# Patient Record
Sex: FEMALE | Race: WHITE | Employment: OTHER | URBAN - METROPOLITAN AREA
[De-identification: names, ages, dates, MRNs, and addresses within clinical notes are randomized per-mention and may not be internally consistent; named-entity substitution may affect disease eponyms.]

---

## 2021-07-26 ENCOUNTER — HOSPITAL ENCOUNTER (OUTPATIENT)
Age: 46
Setting detail: OBSERVATION
Discharge: NURSING, REHAB OR LONG-TERM CARE | End: 2021-07-30
Attending: EMERGENCY MEDICINE | Admitting: INTERNAL MEDICINE
Payer: MEDICAID

## 2021-07-26 ENCOUNTER — APPOINTMENT (OUTPATIENT)
Dept: GENERAL RADIOLOGY | Age: 46
End: 2021-07-26
Payer: MEDICAID

## 2021-07-26 DIAGNOSIS — Z78.9 UNABLE TO CARE FOR SELF: ICD-10-CM

## 2021-07-26 DIAGNOSIS — B35.3 TINEA PEDIS OF BOTH FEET: ICD-10-CM

## 2021-07-26 DIAGNOSIS — G80.9 CEREBRAL PALSY, UNSPECIFIED TYPE (HCC): Primary | ICD-10-CM

## 2021-07-26 LAB
A/G RATIO: 1.4 (ref 1.1–2.2)
ALBUMIN SERPL-MCNC: 4.7 G/DL (ref 3.4–5)
ALP BLD-CCNC: 55 U/L (ref 40–129)
ALT SERPL-CCNC: 11 U/L (ref 10–40)
ANION GAP SERPL CALCULATED.3IONS-SCNC: 12 MMOL/L (ref 3–16)
AST SERPL-CCNC: 13 U/L (ref 15–37)
BACTERIA: ABNORMAL /HPF
BASOPHILS ABSOLUTE: 0.1 K/UL (ref 0–0.2)
BASOPHILS RELATIVE PERCENT: 0.6 %
BILIRUB SERPL-MCNC: <0.2 MG/DL (ref 0–1)
BILIRUBIN URINE: NEGATIVE
BLOOD, URINE: ABNORMAL
BUN BLDV-MCNC: 14 MG/DL (ref 7–20)
CALCIUM SERPL-MCNC: 10 MG/DL (ref 8.3–10.6)
CHLORIDE BLD-SCNC: 104 MMOL/L (ref 99–110)
CLARITY: CLEAR
CO2: 23 MMOL/L (ref 21–32)
COLOR: YELLOW
CREAT SERPL-MCNC: 0.6 MG/DL (ref 0.6–1.1)
EOSINOPHILS ABSOLUTE: 0.1 K/UL (ref 0–0.6)
EOSINOPHILS RELATIVE PERCENT: 0.7 %
EPITHELIAL CELLS, UA: ABNORMAL /HPF (ref 0–5)
GFR AFRICAN AMERICAN: >60
GFR NON-AFRICAN AMERICAN: >60
GLOBULIN: 3.3 G/DL
GLUCOSE BLD-MCNC: 126 MG/DL (ref 70–99)
GLUCOSE URINE: NEGATIVE MG/DL
HCT VFR BLD CALC: 36.2 % (ref 36–48)
HEMOGLOBIN: 12.5 G/DL (ref 12–16)
KETONES, URINE: NEGATIVE MG/DL
LACTIC ACID, SEPSIS: 0.9 MMOL/L (ref 0.4–1.9)
LEUKOCYTE ESTERASE, URINE: NEGATIVE
LYMPHOCYTES ABSOLUTE: 1.4 K/UL (ref 1–5.1)
LYMPHOCYTES RELATIVE PERCENT: 16 %
MCH RBC QN AUTO: 30.3 PG (ref 26–34)
MCHC RBC AUTO-ENTMCNC: 34.5 G/DL (ref 31–36)
MCV RBC AUTO: 87.9 FL (ref 80–100)
MICROSCOPIC EXAMINATION: YES
MONOCYTES ABSOLUTE: 0.6 K/UL (ref 0–1.3)
MONOCYTES RELATIVE PERCENT: 6.3 %
NEUTROPHILS ABSOLUTE: 6.8 K/UL (ref 1.7–7.7)
NEUTROPHILS RELATIVE PERCENT: 76.4 %
NITRITE, URINE: NEGATIVE
PDW BLD-RTO: 13.1 % (ref 12.4–15.4)
PH UA: 6.5 (ref 5–8)
PLATELET # BLD: 287 K/UL (ref 135–450)
PMV BLD AUTO: 8.4 FL (ref 5–10.5)
POTASSIUM REFLEX MAGNESIUM: 3.6 MMOL/L (ref 3.5–5.1)
PROTEIN UA: NEGATIVE MG/DL
RBC # BLD: 4.12 M/UL (ref 4–5.2)
RBC UA: ABNORMAL /HPF (ref 0–4)
SODIUM BLD-SCNC: 139 MMOL/L (ref 136–145)
SPECIFIC GRAVITY UA: 1.02 (ref 1–1.03)
TOTAL PROTEIN: 8 G/DL (ref 6.4–8.2)
TROPONIN: <0.01 NG/ML
URINE REFLEX TO CULTURE: ABNORMAL
URINE TYPE: ABNORMAL
UROBILINOGEN, URINE: 0.2 E.U./DL
WBC # BLD: 8.9 K/UL (ref 4–11)
WBC UA: ABNORMAL /HPF (ref 0–5)

## 2021-07-26 PROCEDURE — P9612 CATHETERIZE FOR URINE SPEC: HCPCS

## 2021-07-26 PROCEDURE — 93005 ELECTROCARDIOGRAM TRACING: CPT | Performed by: PHYSICIAN ASSISTANT

## 2021-07-26 PROCEDURE — 71045 X-RAY EXAM CHEST 1 VIEW: CPT

## 2021-07-26 PROCEDURE — 99283 EMERGENCY DEPT VISIT LOW MDM: CPT

## 2021-07-26 PROCEDURE — 80053 COMPREHEN METABOLIC PANEL: CPT

## 2021-07-26 PROCEDURE — 85025 COMPLETE CBC W/AUTO DIFF WBC: CPT

## 2021-07-26 PROCEDURE — 84484 ASSAY OF TROPONIN QUANT: CPT

## 2021-07-26 PROCEDURE — 2580000003 HC RX 258: Performed by: PHYSICIAN ASSISTANT

## 2021-07-26 PROCEDURE — 96360 HYDRATION IV INFUSION INIT: CPT

## 2021-07-26 PROCEDURE — 83605 ASSAY OF LACTIC ACID: CPT

## 2021-07-26 PROCEDURE — 81001 URINALYSIS AUTO W/SCOPE: CPT

## 2021-07-26 RX ORDER — 0.9 % SODIUM CHLORIDE 0.9 %
1000 INTRAVENOUS SOLUTION INTRAVENOUS ONCE
Status: COMPLETED | OUTPATIENT
Start: 2021-07-26 | End: 2021-07-26

## 2021-07-26 RX ADMIN — SODIUM CHLORIDE 1000 ML: 9 INJECTION, SOLUTION INTRAVENOUS at 20:49

## 2021-07-27 LAB
ANION GAP SERPL CALCULATED.3IONS-SCNC: 13 MMOL/L (ref 3–16)
BASOPHILS ABSOLUTE: 0 K/UL (ref 0–0.2)
BASOPHILS RELATIVE PERCENT: 0.6 %
BUN BLDV-MCNC: 11 MG/DL (ref 7–20)
CALCIUM SERPL-MCNC: 9.6 MG/DL (ref 8.3–10.6)
CHLORIDE BLD-SCNC: 107 MMOL/L (ref 99–110)
CO2: 21 MMOL/L (ref 21–32)
CREAT SERPL-MCNC: <0.5 MG/DL (ref 0.6–1.1)
EKG ATRIAL RATE: 102 BPM
EKG DIAGNOSIS: NORMAL
EKG P AXIS: 48 DEGREES
EKG P-R INTERVAL: 128 MS
EKG Q-T INTERVAL: 528 MS
EKG QRS DURATION: 72 MS
EKG QTC CALCULATION (BAZETT): 688 MS
EKG R AXIS: 88 DEGREES
EKG T AXIS: 58 DEGREES
EKG VENTRICULAR RATE: 102 BPM
EOSINOPHILS ABSOLUTE: 0.1 K/UL (ref 0–0.6)
EOSINOPHILS RELATIVE PERCENT: 1 %
GFR AFRICAN AMERICAN: >60
GFR NON-AFRICAN AMERICAN: >60
GLUCOSE BLD-MCNC: 103 MG/DL (ref 70–99)
HCT VFR BLD CALC: 34.7 % (ref 36–48)
HEMOGLOBIN: 11.7 G/DL (ref 12–16)
LYMPHOCYTES ABSOLUTE: 1.4 K/UL (ref 1–5.1)
LYMPHOCYTES RELATIVE PERCENT: 21.2 %
MCH RBC QN AUTO: 30.4 PG (ref 26–34)
MCHC RBC AUTO-ENTMCNC: 33.8 G/DL (ref 31–36)
MCV RBC AUTO: 89.8 FL (ref 80–100)
MONOCYTES ABSOLUTE: 0.4 K/UL (ref 0–1.3)
MONOCYTES RELATIVE PERCENT: 5.5 %
NEUTROPHILS ABSOLUTE: 4.8 K/UL (ref 1.7–7.7)
NEUTROPHILS RELATIVE PERCENT: 71.7 %
PDW BLD-RTO: 13.1 % (ref 12.4–15.4)
PLATELET # BLD: 265 K/UL (ref 135–450)
PMV BLD AUTO: 8.6 FL (ref 5–10.5)
POTASSIUM REFLEX MAGNESIUM: 3.6 MMOL/L (ref 3.5–5.1)
RBC # BLD: 3.86 M/UL (ref 4–5.2)
SODIUM BLD-SCNC: 141 MMOL/L (ref 136–145)
WBC # BLD: 6.7 K/UL (ref 4–11)

## 2021-07-27 PROCEDURE — 36415 COLL VENOUS BLD VENIPUNCTURE: CPT

## 2021-07-27 PROCEDURE — G0378 HOSPITAL OBSERVATION PER HR: HCPCS

## 2021-07-27 PROCEDURE — 97166 OT EVAL MOD COMPLEX 45 MIN: CPT

## 2021-07-27 PROCEDURE — 80048 BASIC METABOLIC PNL TOTAL CA: CPT

## 2021-07-27 PROCEDURE — 97162 PT EVAL MOD COMPLEX 30 MIN: CPT

## 2021-07-27 PROCEDURE — 96372 THER/PROPH/DIAG INJ SC/IM: CPT

## 2021-07-27 PROCEDURE — 97110 THERAPEUTIC EXERCISES: CPT

## 2021-07-27 PROCEDURE — 93010 ELECTROCARDIOGRAM REPORT: CPT | Performed by: INTERNAL MEDICINE

## 2021-07-27 PROCEDURE — 2580000003 HC RX 258: Performed by: NURSE PRACTITIONER

## 2021-07-27 PROCEDURE — 97535 SELF CARE MNGMENT TRAINING: CPT

## 2021-07-27 PROCEDURE — 6360000002 HC RX W HCPCS: Performed by: NURSE PRACTITIONER

## 2021-07-27 PROCEDURE — 85025 COMPLETE CBC W/AUTO DIFF WBC: CPT

## 2021-07-27 PROCEDURE — 97116 GAIT TRAINING THERAPY: CPT

## 2021-07-27 RX ORDER — ACETAMINOPHEN 650 MG/1
650 SUPPOSITORY RECTAL EVERY 6 HOURS PRN
Status: DISCONTINUED | OUTPATIENT
Start: 2021-07-27 | End: 2021-07-30 | Stop reason: HOSPADM

## 2021-07-27 RX ORDER — SODIUM CHLORIDE 0.9 % (FLUSH) 0.9 %
10 SYRINGE (ML) INJECTION PRN
Status: DISCONTINUED | OUTPATIENT
Start: 2021-07-27 | End: 2021-07-30 | Stop reason: HOSPADM

## 2021-07-27 RX ORDER — ONDANSETRON 2 MG/ML
4 INJECTION INTRAMUSCULAR; INTRAVENOUS EVERY 6 HOURS PRN
Status: DISCONTINUED | OUTPATIENT
Start: 2021-07-27 | End: 2021-07-27

## 2021-07-27 RX ORDER — SODIUM CHLORIDE 0.9 % (FLUSH) 0.9 %
10 SYRINGE (ML) INJECTION EVERY 12 HOURS SCHEDULED
Status: DISCONTINUED | OUTPATIENT
Start: 2021-07-27 | End: 2021-07-30 | Stop reason: HOSPADM

## 2021-07-27 RX ORDER — ONDANSETRON 4 MG/1
4 TABLET, ORALLY DISINTEGRATING ORAL EVERY 8 HOURS PRN
Status: DISCONTINUED | OUTPATIENT
Start: 2021-07-27 | End: 2021-07-27

## 2021-07-27 RX ORDER — SODIUM CHLORIDE 9 MG/ML
25 INJECTION, SOLUTION INTRAVENOUS PRN
Status: DISCONTINUED | OUTPATIENT
Start: 2021-07-27 | End: 2021-07-30 | Stop reason: HOSPADM

## 2021-07-27 RX ORDER — ACETAMINOPHEN 325 MG/1
650 TABLET ORAL EVERY 6 HOURS PRN
Status: DISCONTINUED | OUTPATIENT
Start: 2021-07-27 | End: 2021-07-30 | Stop reason: HOSPADM

## 2021-07-27 RX ADMIN — Medication 10 ML: at 10:44

## 2021-07-27 RX ADMIN — Medication 10 ML: at 21:11

## 2021-07-27 RX ADMIN — ENOXAPARIN SODIUM 40 MG: 40 INJECTION SUBCUTANEOUS at 10:43

## 2021-07-27 ASSESSMENT — ENCOUNTER SYMPTOMS
ABDOMINAL PAIN: 0
EYE PAIN: 0
SORE THROAT: 0
COUGH: 0
BACK PAIN: 0
SHORTNESS OF BREATH: 0
NAUSEA: 0
VOMITING: 0

## 2021-07-27 NOTE — ED PROVIDER NOTES
[address] [facility]  ED  EMERGENCY DEPARTMENT ENCOUNTER        Pt Name: Joseph Bolton  MRN: [id number]  Armstrongfkaran 1975  Date of evaluation: 2021  Provider: TANI Ma  PCP: Laura Castro MD  Note Started: 1:11 AM EDT        I have seen and evaluated this patient with my supervising physician Marla West MD.    CHIEF COMPLAINT       Chief Complaint   Patient presents with    Other     brought in by brother. has cerebral palsy, mom and dad have been lifetime caregivers. mom  last year, father is currently hospitalized. patient not able to care for self. HISTORY OF PRESENT ILLNESS   (Location, Timing/Onset, Context/Setting, Quality, Duration, Modifying Factors, Severity, Associated Signs and Symptoms)  Note limiting factors. Chief Complaint: Unable to care for self     Joseph Bolton is a 39 y.o. female who presents significant past medical history of cerebral palsy presents emergency room with brother with chief concern of unable to care for herself. Patient has always had her mother and her father be her caregivers. Her mother passed away 1 year ago. Her father is currently admitted to the hospital and will likely be placed in a nursing home. Brother is found out that patient has not had been able to properly care for herself over the last year. Notes significant dryness of the skin of her feet. States that she has not had not bathed in over 1 year. She requires lifting assistance, he has a bad back and is unable to care for. He also has his own family care for it there is no one else who can help care for this patient. Contacted her insurance company, they recommend she go to the emergency room for placement. Patient is agreeable with this plan. She denies any medical complaints at this time. Nursing Notes were all reviewed and agreed with or any disagreements were addressed in the HPI.     REVIEW OF SYSTEMS    (2-9 systems for level 4, 10 or more for level 5)     Review of Systems   Constitutional: Negative for fever. HENT: Negative for sore throat. Eyes: Negative for pain and visual disturbance. Respiratory: Negative for cough and shortness of breath. Cardiovascular: Negative for chest pain. Gastrointestinal: Negative for abdominal pain, nausea and vomiting. Genitourinary: Negative for dysuria and frequency. Musculoskeletal: Negative for back pain and neck pain. Skin: Negative for rash. Neurological: Negative for dizziness, weakness, numbness and headaches. Psychiatric/Behavioral: Negative for confusion. Positives and Pertinent negatives as per HPI. Except as noted above in the ROS, all other systems were reviewed and negative. PAST MEDICAL HISTORY     Past Medical History:   Diagnosis Date    Cerebral palsy ([facility] [address]          SURGICAL HISTORY   History reviewed. No pertinent surgical history. CURRENTMEDICATIONS       Previous Medications    No medications on file         ALLERGIES     Patient has no allergy information on record. FAMILYHISTORY     History reviewed. No pertinent family history. SOCIAL HISTORY       Social History     Tobacco Use    Smoking status: Never Smoker    Smokeless tobacco: Never Used   Substance Use Topics    Alcohol use: Never    Drug use: Never       SCREENINGS             PHYSICAL EXAM    (up to 7 for level 4, 8 or more for level 5)     ED Triage Vitals [07/26/21 1845]   BP Temp Temp Source Pulse Resp SpO2 Height Weight   139/71 97.7 °F (36.5 °C) Oral 106 18 100 % 5' 5\" (1.651 m) 100 lb (45.4 kg)       Physical Exam  Vitals reviewed. Constitutional:       Appearance: She is not diaphoretic. HENT:      Nose: No congestion or rhinorrhea. Eyes:      General: No scleral icterus. Conjunctiva/sclera: Conjunctivae normal.   Cardiovascular:      Rate and Rhythm: Regular rhythm. Tachycardia present. Pulses: Normal pulses. Heart sounds: Normal heart sounds.  No murmur heard.   No friction rub. No gallop. Pulmonary:      Effort: Pulmonary effort is normal. No respiratory distress. Breath sounds: Normal breath sounds. No stridor. No wheezing, rhonchi or rales. Abdominal:      General: There is no distension. Palpations: Abdomen is soft. Tenderness: There is no abdominal tenderness. There is no guarding or rebound. Musculoskeletal:         General: No swelling or tenderness. Normal range of motion. Cervical back: Normal range of motion and neck supple. Skin:     General: Skin is warm and dry. Capillary Refill: Capillary refill takes less than 2 seconds. Comments: Bilateral tinea pedis. Neurological:      General: No focal deficit present. Mental Status: She is alert and oriented to person, place, and time. Sensory: No sensory deficit. Motor: No weakness. Psychiatric:         Behavior: Behavior normal.      Comments: Depressed mood and affect. Tearful.          DIAGNOSTIC RESULTS   LABS:    Labs Reviewed   COMPREHENSIVE METABOLIC PANEL W/ REFLEX TO MG FOR LOW K - Abnormal; Notable for the following components:       Result Value    Glucose 126 (*)     AST 13 (*)     All other components within normal limits    Narrative:     Performed at:  [place] [address] [email or web address]   Phone [phone]   URINE RT REFLEX TO CULTURE - Abnormal; Notable for the following components:    Blood, Urine MODERATE (*)     All other components within normal limits    Narrative:     Performed at:  [place] [address] [email or web address]   Phone [phone]   MICROSCOPIC URINALYSIS - Abnormal; Notable for the following components:    RBC, UA 11-20 (*)     Epithelial Cells, UA 11-20 (*)     Bacteria, UA 1+ (*)     All other components within normal limits    Narrative:     Performed at:  [facility]  [address] [place] [address], [place], [place] [employer]   Phone [phone]   CBC WITH AUTO DIFFERENTIAL    Narrative:     Performed at:  [facility]) - [address],  [place], [place] [employer]   Phone [phone]   LACTATE, SEPSIS    Narrative:     Performed at:  [facility]) [address]  [place], [place] [employer]   Phone [phone]   TROPONIN    Narrative:     Performed at:  [facility]) - [address]  [place], [place] [employer]   Phone [phone]       When ordered only abnormal lab results are displayed. All other labs were within normal range or not returned as of this dictation. EKG: When ordered, EKG's are interpreted by the Emergency Department Physician in the absence of a cardiologist.  Please see their note for interpretation of EKG. RADIOLOGY:   Non-plain film images such as CT, Ultrasound and MRI are read by the radiologist. Plain radiographic images are visualized and preliminarily interpreted by the ED Provider with the below findings:        Interpretation per the Radiologist below, if available at the time of this note:    XR CHEST PORTABLE   Final Result   Mild chronic obstructive lung changes with mild discoid atelectasis or   scarring along the perihilar region on the right and extending inferiorly           XR CHEST PORTABLE    Result Date: 7/26/2021  EXAMINATION: ONE XRAY VIEW OF THE CHEST 7/26/2021 8:16 pm COMPARISON: None. HISTORY: ORDERING SYSTEM PROVIDED HISTORY: tachycardia TECHNOLOGIST PROVIDED HISTORY: Reason for exam:->tachycardia Reason for Exam: tachycardia Acuity: Acute Type of Exam: Initial FINDINGS: The heart is normal.  The pulmonary vessels are normal.  The lungs are mildly hyperinflated with mild linear densities scattered along the right perihilar region extending inferiorly. No effusion or consolidation is seen. The bones are intact.      Mild chronic obstructive lung changes with mild discoid atelectasis or scarring along the perihilar region on the right and extending inferiorly           PROCEDURES   Unless otherwise noted below, none     Procedures    CRITICAL CARE TIME   N/A    CONSULTS:  IP CONSULT TO CASE MANAGEMENT      EMERGENCY DEPARTMENT COURSE and DIFFERENTIAL DIAGNOSIS/MDM:   Vitals:    Vitals:    07/26/21 2314 07/26/21 2344 07/27/21 0014 07/27/21 0046   BP: 120/81 120/76 131/70 116/68   Pulse: 100 90 85 88   Resp: 14 15 17 15   Temp:       TempSrc:       SpO2: 98% 98% 98% 99%   Weight:       Height:           Patient was given the following medications:  Medications   0.9 % sodium chloride bolus (0 mLs Intravenous Stopped 7/26/21 2149)           53-year-old female presents the emergency department due to being unable to care for herself. See HPI for details. She denies any complaints. She did present with tachycardia, labs, EKG, chest x-ray without emergent findings. Has resolved with minimal IV fluids. No suspicion for sepsis,  PE at this time due to being asymptomatic. Patient will be admitted for assistance with placement. Perfect serve message sent to hospitalist Dr. Emily Hopkins, admission orders have been placed for the patient. FINAL IMPRESSION      1. Cerebral palsy, unspecified type ([facility] [address]    2. Tinea pedis of both feet    3. Unable to care for self          DISPOSITION/PLAN   DISPOSITION Admitted 07/26/2021 11:36:39 PM      PATIENT REFERRED TO:  No follow-up provider specified.     DISCHARGE MEDICATIONS:  New Prescriptions    No medications on file       DISCONTINUED MEDICATIONS:  Discontinued Medications    No medications on file              (Please note that portions of this note were completed with a voice recognition program.  Efforts were made to edit the dictations but occasionally words are mis-transcribed.)    TANI Trimble (electronically signed)         TANI Trimble  07/27/21 3321

## 2021-07-27 NOTE — CARE COORDINATION
CASE MANAGEMENT INITIAL ASSESSMENT      Reviewed chart and completed assessment with: Pt and brother  Explained Case Management role/services:  Primary contact information: Brother, [facility] Decision Maker :   Primary Decision Maker: Milad Uriarte - Child - [phone]          Can this person be reached and be able to respond quickly, such as within a few minutes or hours? Yes    Admit date/status: Observation, 7/27/21  Diagnosis: Cerebal Palsy  Is this a Readmission?:  No      Insurance: [address] required for SNF: Yes       3 night stay required: Yes    Living arrangements, Adls, care needs, prior to admission: Lives in a first floor apartment with Dad 0SE. Has help with ADL;s when needed. Transportation: family    Durable Medical Equipment at home:  Walker_X_Cane__RTS__ BSC__Shower Chair__  02__ HHN__ CPAP__  BiPap__  Hospital Bed__ W/CX___ Other__________    Services in the home and/or outpatient, prior to admission: None    PT/OT recs: [address] Notification (HEN): needed for SNF, not initiated. Barriers to discharge: None    Plan/comments: CM met with pt and brother at bedside for initial assessment and to discuss therapy recs for SNF. Updated by brother that dad is here at [facility] on C3 and discharging to [facility] pend precert. Pt has always lived with her Dad. Now pt likely will not be able to go to a facility in [facility]. Long discussion and brother wanting a referral to [facility]. Referral faxed, spoke to Cara Brown to confirm. Writer also sent a referral to [facility] [address] and spoke to Fani Soto to run pt's benefits. Referral placed to TATIANNA Trivedi with [facility] to help with long term plans and education on AL, IL and LTC.  CM following-Kennedi Dumont RN      ECOC on chart for MD signature

## 2021-07-27 NOTE — ED PROVIDER NOTES
I independently performed a history and physical on Nena Harrington. All diagnostic, treatment, and disposition decisions were made by myself in conjunction with the advanced practice provider. I have participated in the medical decision making and directed the treatment plan and disposition of the patient. For further details of [facility] emergency department encounter, please see the advanced practice provider's documentation. CHIEF COMPLAINT  Chief Complaint   Patient presents with    Other     brought in by brother. has cerebral palsy, mom and dad have been lifetime caregivers. mom  last year, father is currently hospitalized. patient not able to care for self. Briefly, Nena Harrington is a 39 y.o. female  who presents to the ED complaining of difficulty caring for self. Typically taken care of by her father who is currently hospitalized after a bad fall. Brother has not been able to care for her or lift her or bathe her due to back problems. She otherwise states she has no acute complaints    FOCUSED PHYSICAL EXAMINATION  /73   Pulse 98   Temp 97.7 °F (36.5 °C) (Oral)   Resp 19   Ht 5' 5\" (1.651 m)   Wt 100 lb (45.4 kg)   SpO2 99%   BMI 16.64 kg/m²      Focused physical examination:  General appearance:  Cooperative. Slightly disheveled  Skin:  Warm. Dry. Eye:  Extraocular movements intact. Chronically disconjugate gaze  Ears, nose, mouth and throat:  Oral mucosa moist,  Neck:  Trachea midline. Heart: Tachycardic but regular  Perfusion:  intact  Respiratory:  Lungs clear to auscultation bilaterally. Respirations nonlabored. Abdominal:   Non distended. Nontender  Neurological:  Alert and oriented x 3. Moves all extremities spontaneously  Musculoskeletal:   Normal ROM, no deformities          Psychiatric:  Normal mood      EKG: Sinus tachycardia rate of 102 bpm.  Nonspecific T wave changes laterally. No ST elevation.   No prior    MDM: Patient presents emergency department today because her family is unable to take care of her.'s history of cerebral palsy and she is unable to bathe herself or perform some of her activities of daily living. States she is otherwise in her normal baseline status but was found to be somewhat tachycardic here promptly with a laboratory work-up showing no acute abnormalities. She was given IV fluids here. Is resting comfortably. Is unable to care for self and somewhat disheveled here and plan to admit for placement    During the patient's ED course, the patient was given:  Medications   0.9 % sodium chloride bolus (0 mLs Intravenous Stopped 7/26/21 0612)        CLINICAL IMPRESSION  1. Cerebral palsy, unspecified type ([address]    2. Tinea pedis of both feet    3. Unable to care for self        DISPOSITION  Admission      This chart was created using Dragon dictation software. Efforts were made by me to ensure accuracy, however some errors may be present due to limitations of this technology.             Wale Mora MD  07/26/21 2141

## 2021-07-27 NOTE — PROGRESS NOTES
Occupational Therapy   Occupational Therapy Initial Assessment  Date: 2021   Patient Name: Per Marquez  MRN: [id number]     : 1975    Date of Service: 2021    Discharge Recommendations:  [address], 3-5 sessions per week  OT Equipment Recommendations  Equipment Needed: No  Other: Defer    Assessment   Performance deficits / Impairments: Decreased ADL status; Decreased strength;Decreased endurance;Decreased functional mobility   Assessment: Pt is a 39 y.o. F with CP presenting with inability to care for self after primary caregiver has been hospitalized. Pt requires assist for ADLs and mobility at baseline, however, appears to be presenting below functional baseline. Pt would benefit from continued acute OT and OT therapy at d/c. Treatment Diagnosis: CP  Prognosis: Fair  Decision Making: Medium Complexity  OT Education: OT Role;Plan of Care  Patient Education: v/u  Barriers to Learning: n/a  REQUIRES OT FOLLOW UP: Yes  Activity Tolerance  Activity Tolerance: Patient Tolerated treatment well  Safety Devices  Safety Devices in place: Yes  Type of devices: Left in chair;Chair alarm in place;Call light within reach;Gait belt; All fall risk precautions in place           Patient Diagnosis(es): The primary encounter diagnosis was Cerebral palsy, unspecified type ([address] Diagnoses of Tinea pedis of both feet and Unable to care for self were also pertinent to this visit. has a past medical history of Cerebral palsy ([address] has no past surgical history on file.     Treatment Diagnosis: CP      Restrictions  Restrictions/Precautions  Restrictions/Precautions: Up as Tolerated, General Precautions, Fall Risk    Subjective   General  Chart Reviewed: Yes  Patient assessed for rehabilitation services?: Yes  Family / Caregiver Present: No  Diagnosis: Cerebral palsy, unspecified  Subjective  Subjective: Pt met bedside, very pleseant and agreeable to OT eval.  Patient Currently in Pain: Denies  Vital Signs  Temp: 98.2 °F (36.8 °C)  Temp Source: Oral  Pulse: 89  Heart Rate Source: Monitor  Resp: 16  BP: 129/69  BP Location: Left lower arm  Patient Currently in Pain: Denies  Oxygen Therapy  SpO2: 99 %  Social/Functional History  Social/Functional History  Lives With: Family (dad)  Type of Home: Apartment  Home Layout: One level  Home Access: Level entry  Bathroom Shower/Tub:  (pt sponge bathes at baseline at sink level)  Bathroom Toilet: Handicap height  Bathroom Accessibility:  (pt reporting that she cannot fit her w/c into her bathroom, she is able to fit her w/c into dad's bathroom.)  Home Equipment: [employer] (pt reporting that she has a walker nut brother has it as his house.)  ADL Assistance: Needs assistance (pt requires assist to bathe, able to dress self.)  Homemaking Responsibilities: No  Ambulation Assistance: Needs assistance (pt has someone push wheelchair)  Transfer Assistance: Needs assistance (father helps transfers to w/c)  Leisure & Hobbies: enjoys puzzles. Objective   Vision: Impaired  Vision Exceptions: Wears glasses at all times  Hearing: Within functional limits    Orientation  Overall Orientation Status: Within Normal Limits  Observation/Palpation  Body Mechanics: interiorly rotation of BLE. Approrpaite use of UE. FM approrpaite for ADL completion. Balance  Sitting Balance: Minimal assistance  Standing Balance: Dependent/Total (Mod A x2)  Standing Balance  Time: 15 sec x2  Activity: stand pivot transfer/stance for ADLcompletion  Functional Mobility  Functional - Mobility Device: Other ([place] assist)  Activity: Other  Assist Level: Dependent/Total  Functional Mobility Comments: Mod A x2  ADL  Grooming: Minimal assistance; Increased time to complete (pt washed hair and brushed teeth in supported sitting.)  UE Bathing: Contact guard assistance  LE Bathing: Maximum assistance  UE Dressing: Minimal assistance  LE Dressing: Maximum assistance (therapist donned socks and shoes)  Toileting: Maximum assistance  Tone RUE  RUE Tone: Normotonic  Tone LUE  LUE Tone: Normotonic  Coordination  Movements Are Fluid And Coordinated: Yes        Transfers  Stand Pivot Transfers: 2 Person assistance; Moderate assistance  Sit to stand: Moderate assistance;2 Person assistance  Stand to sit: 2 Person assistance; Moderate assistance     Cognition  Overall Cognitive Status: WFL                 LUE AROM (degrees)  LUE AROM : WFL  Left Hand AROM (degrees)  Left Hand AROM: WFL  Left Hand General AROM: hand rests with thumb in oposition and fingers full extended. Pt with functional mobility of hands. RUE AROM (degrees)  RUE AROM : WFL  Right Hand AROM (degrees)  Right Hand AROM: WFL  LUE Strength  Gross LUE Strength: Exceptions to [facility]  L Shoulder Flex: 5/5  L Elbow Flex: 4/5  L Hand General: 5/5  RUE Strength  Gross RUE Strength: WFL  R Hand General: 5/5                   Plan   Plan  Times per week: 3-5x  Times per day: Daily  Specific instructions for Next Treatment: cotx  Current Treatment Recommendations: Strengthening, Endurance Training, Wheelchair Mobility Training, Functional Mobility Training, Balance Training, Self-Care / ADL            AM-[facility] Inpatient Daily Activity Raw Score: 15 (07/27/21 0915)  AM-[facility] Inpatient ADL T-Scale Score : 34.69 (07/27/21 0915)  ADL Inpatient CMS 0-100% Score: 56.46 (07/27/21 0915)  ADL Inpatient CMS G-Code Modifier : CK (07/27/21 0915)    Goals  Short term goals  Time Frame for Short term goals: 1 week- 8/3/21  Short term goal 1: Pt will complete UBD with CGA  Short term goal 2: Pt will complete LB bathing with Mod A  Short term goal 3: Pt will complete SPV with Max A x1 by 7/30 to improve ability to participate in ADLs  Short term goal 4: Pt will complete grooming with SBA in seated position.   Long term goals  Time Frame for Long term goals : STGs=LTGs  Patient Goals   Patient goals : \"to go somewhere like a group home\"       Therapy Time   Individual Concurrent Group Co-treatment   Time In 0804         Time Out 0840         Minutes 36              Timed Code Treatment Minutes:  26 min   Total Treatment Minutes:  39 min       Alannah Rossi OT

## 2021-07-27 NOTE — CARE COORDINATION
CM spoke to Bhavana Pendleton with [facility] and updated that they are able to accept and initiated precert. Spoke to RASHEEDA with [employer] and they are Not able to accept as payor is out of network. Writer also met with pt's dad at bedside who is on [address] with Isrrael RN/CONSTANZA and brother Isai Cueva. Dad is in agreement with DCP for daughter for [facility].  CM following-Kennedi Dumont RN

## 2021-07-27 NOTE — PROGRESS NOTES
Hospitalist Progress Note      PCP: Gris Hill MD    Date of Admission: 7/26/2021        Subjective:     No acute issues reported. . Patient feels well with no complaints      Medications:  Reviewed    Infusion Medications    sodium chloride       Scheduled Medications    sodium chloride flush  10 mL Intravenous 2 times per day    enoxaparin  40 mg Subcutaneous Daily     PRN Meds: sodium chloride flush, sodium chloride, magnesium hydroxide, acetaminophen **OR** acetaminophen      Intake/Output Summary (Last 24 hours) at 7/27/2021 0903  Last data filed at 7/26/2021 2149  Gross per 24 hour   Intake 1000 ml   Output --   Net 1000 ml       Exam:    /69   Pulse 89   Temp 98.2 °F (36.8 °C) (Oral)   Resp 16   Ht 5' 5\" (1.651 m)   Wt 100 lb (45.4 kg)   SpO2 99%   BMI 16.64 kg/m²     General appearance: No apparent distress, appears stated age and cooperative. HEENT: Pupils equal, round, and reactive to light. Conjunctivae/corneas clear. Neck: Supple, with full range of motion. No jugular venous distention. Trachea midline. Respiratory:  Normal respiratory effort. Clear to auscultation, bilaterally without Rales/Wheezes/Rhonchi. Cardiovascular: Regular rate and rhythm with normal S1/S2 without murmurs, rubs or gallops. Abdomen: Soft, non-tender, non-distended with normal bowel sounds. Musculoskeletal: No clubbing, cyanosis or edema bilaterally. Full range of motion without deformity. Skin: Skin color, texture, turgor normal.  No rashes or lesions. Neurologic:  Neurovascularly intact without any focal sensory/motor deficits.  Cranial nerves: II-XII intact, grossly non-focal.  Capillary Refill: Brisk,< 3 seconds   Peripheral Pulses: +2 palpable, equal bilaterally       Labs:   Recent Labs     07/26/21 1949   WBC 8.9   HGB 12.5   HCT 36.2        Recent Labs     07/26/21 1949      K 3.6      CO2 23   BUN 14   CREATININE 0.6   CALCIUM 10.0     Recent Labs     07/26/21 1949   AST 13*   ALT 11   BILITOT <0.2   ALKPHOS 55     No results for input(s): INR in the last 72 hours. Recent Labs     07/26/21  1949   TROPONINI <0.01       Assessment/Plan:      -Caregiver crisis. . Patient has cerebral palsy and unable to take care of herself. Primary caregiver is currently hospitalized. .  -Cerebral palsy with weakness below her baseline. . Continue supportive care--needs SNF    DVT Prophylaxis: Lovenox  Diet: ADULT DIET;  Regular  Code Status: Full Code    Discharge to SNF once approved--needs precert    Sheree Carr MD

## 2021-07-27 NOTE — H&P
Hospital Medicine History & Physical      PCP: Geena Adams MD    Date of Admission: 2021    Date of Service: Pt seen/examined on 2021 and Admitted to observation with expected LOS less than two midnights due to medical therapy. Chief Complaint:    Chief Complaint   Patient presents with    Other     brought in by brother. has cerebral palsy, mom and dad have been lifetime caregivers. mom  last year, father is currently hospitalized. patient not able to care for self. History Of Present Illness:     39 y.o. female, with PMH of cerebral palsy, who presented to [facility] with inability caring for self. History was obtained from the patient and review of the EMR. The patient states that she has history of cerebral palsy and typically her parents have taken care of her throughout her life. However, her mother passed away last year and now her father is currently being hospitalized. She has been unable to care for herself at home since this occurred. She was brought in by her brother who was unable to care for her physically as he has medical issues of his own. She will be admitted for further evaluation and possible placement. Past Medical History:          Diagnosis Date    Cerebral palsy [facility])        Past Surgical History:      History reviewed. No pertinent surgical history. Medications Prior to Admission:      Prior to Admission medications    Not on File       Allergies:  Patient has no known allergies. Social History:      The patient currently lives with her family. TOBACCO:   reports that she has never smoked. She has never used smokeless tobacco.  ETOH:   reports no history of alcohol use. Family History:      Reviewed in detail. Positive as follows:    History reviewed. No pertinent family history. REVIEW OF SYSTEMS:   Pertinent positives as noted in the HPI. All other systems reviewed and negative.     PHYSICAL EXAM PERFORMED:    /80   Pulse 89 Temp 97.7 °F (36.5 °C) (Oral)   Resp 16   Ht 5' 5\" (1.651 m)   Wt 100 lb (45.4 kg)   SpO2 98%   BMI 16.64 kg/m²     General appearance:  No apparent distress, appears stated age and cooperative. HEENT:  Normal cephalic, atraumatic without obvious deformity. Pupils equal, round, and reactive to light. Extra ocular muscles intact. Conjunctivae/corneas clear. Neck: Supple, with full range of motion. No jugular venous distention. Trachea midline. Respiratory:  Normal respiratory effort. Clear to auscultation, bilaterally without Rales/Wheezes/Rhonchi. Cardiovascular:  Regular rate and rhythm with normal S1/S2 without murmurs, rubs or gallops. Abdomen: Soft, non-tender, non-distended with normal bowel sounds. Musculoskeletal:  No clubbing, cyanosis or edema bilaterally. Full range of motion without deformity. Skin: Skin color, texture, turgor normal.  No rashes or lesions. Neurologic:  Neurovascularly intact without any focal sensory/motor deficits. Cranial nerves: II-XII intact, grossly non-focal.  Psychiatric:  Alert and oriented, thought content appropriate, normal insight  Capillary Refill: Brisk,< 3 seconds   Peripheral Pulses: +2 palpable, equal bilaterally       Labs:     Recent Labs     07/26/21 1949   WBC 8.9   HGB 12.5   HCT 36.2        Recent Labs     07/26/21 1949      K 3.6      CO2 23   BUN 14   CREATININE 0.6   CALCIUM 10.0     Recent Labs     07/26/21 1949   AST 13*   ALT 11   BILITOT <0.2   ALKPHOS 55     No results for input(s): INR in the last 72 hours.   Recent Labs     07/26/21 1949   TROPONINI <0.01       Urinalysis:      Lab Results   Component Value Date    NITRU Negative 07/26/2021    WBCUA 0-2 07/26/2021    BACTERIA 1+ 07/26/2021    RBCUA 11-20 07/26/2021    BLOODU MODERATE 07/26/2021    SPECGRAV 1.020 07/26/2021    GLUCOSEU Negative 07/26/2021       Radiology:     CXR: I have reviewed the CXR with the following interpretation: Atelectasis versus scarring  EKG:  I have reviewed the EKG with the following interpretation: Sinus tachycardia, rate of 102    XR CHEST PORTABLE   Final Result   Mild chronic obstructive lung changes with mild discoid atelectasis or   scarring along the perihilar region on the right and extending inferiorly             ASSESSMENT:    Active Hospital Problems    Diagnosis Date Noted    Cerebral palsy, unspecified ([facility] [G80.9] 07/26/2021         PLAN:    Debility, weakness in setting of history of cerebral palsy. Her primary caregiver is currently being hospitalized and she is unable to care for herself at home safely.  - PT/OT consulted, thank you  - Case management consulted, thank you  - Fall precautions      DVT Prophylaxis: lovenox  Diet: ADULT DIET; Regular  Code Status: Full Code    PT/OT Eval Status: Ordered and pending    Dispo - pending PT OT eval and possible placement       Teri Clay - NP    Thank you Amee Whitaker MD for the opportunity to be involved in this patient's care.  If you have any questions or concerns please feel free to contact me at [phone].  -------------------------Anticipated Dr. Rubi garcia---------------------

## 2021-07-27 NOTE — ED NOTES
Inserted a straight catheter using sterile technique in order to obtain a clean urine sample. Emptied bladder of approx 500 mL of urine. Sent urine sample to lab. Removed catheter. Patient tolerated well.      Nahum Jacobo  07/26/21 6359

## 2021-07-28 PROCEDURE — 2580000003 HC RX 258: Performed by: NURSE PRACTITIONER

## 2021-07-28 PROCEDURE — 97110 THERAPEUTIC EXERCISES: CPT

## 2021-07-28 PROCEDURE — 97116 GAIT TRAINING THERAPY: CPT

## 2021-07-28 PROCEDURE — G0378 HOSPITAL OBSERVATION PER HR: HCPCS

## 2021-07-28 PROCEDURE — 96372 THER/PROPH/DIAG INJ SC/IM: CPT

## 2021-07-28 PROCEDURE — 97530 THERAPEUTIC ACTIVITIES: CPT

## 2021-07-28 PROCEDURE — 97535 SELF CARE MNGMENT TRAINING: CPT

## 2021-07-28 PROCEDURE — 6360000002 HC RX W HCPCS: Performed by: NURSE PRACTITIONER

## 2021-07-28 RX ADMIN — ENOXAPARIN SODIUM 40 MG: 40 INJECTION SUBCUTANEOUS at 08:11

## 2021-07-28 RX ADMIN — Medication 10 ML: at 19:44

## 2021-07-28 RX ADMIN — Medication 10 ML: at 08:12

## 2021-07-28 NOTE — PROGRESS NOTES
Physical Therapy  Facility/Department: [facility] C5 - MED SURG/ORTHO  Daily Treatment Note  NAME: Nena Harrington  : 1975  MRN: [id number]    Date of Service: 2021    Discharge Recommendations:  Subacute/Skilled Nursing Facility        Assessment   Body structures, Functions, Activity limitations: Decreased functional mobility ; Decreased balance;Decreased endurance;Decreased strength  Assessment: Today pt required Ax2 for mobility and has AM PAC score of 12 indicating she will benefit from skilled PT to address current deficits. Recommend SNF at discharge. Pt voices motivation  Treatment Diagnosis: weakness, decreased mobility  Prognosis: Good  Decision Making: Medium Complexity  Barriers to Learning: none  REQUIRES PT FOLLOW UP: Yes  Activity Tolerance  Activity Tolerance: Patient Tolerated treatment well;Patient limited by endurance  Activity Tolerance: /76  HR 97  O2 98%     Patient Diagnosis(es): The primary encounter diagnosis was Cerebral palsy, unspecified type ([facility] Diagnoses of Tinea pedis of both feet and Unable to care for self were also pertinent to this visit. has a past medical history of Cerebral palsy ([facility] has no past surgical history on file. Restrictions  Restrictions/Precautions  Restrictions/Precautions: Up as Tolerated, General Precautions, Fall Risk  Subjective   General  Chart Reviewed: Yes  Response To Previous Treatment: Patient with no complaints from previous session.   Family / Caregiver Present: No (Pt's brother on speaker phone at start of session with questions and input about pt's care)  Referring Practitioner: Mary Soto  Subjective  Subjective: Pt agees to PT session  General Comment  Comments: RN cleared pt for therapy, pt resting in bed on approach  Pain Screening  Patient Currently in Pain: Denies  Vital Signs  Patient Currently in Pain: Denies       Orientation  Orientation  Overall Orientation Status: Within Functional Limits  Cognition      Objective Bed mobility  Supine to Sit: Stand by assistance (HOB partially elevated, use of bed rails, pt moves slowly in small increments)  Transfers  Sit to Stand: 2 Person Assistance; Moderate Assistance  Stand to sit: 2 Person Assistance; Moderate Assistance  Ambulation  Ambulation?: Yes  Ambulation 1  Device: Standard Walker (w/ chair f/u)  Assistance:  Moderate assistance;2 Person assistance  Quality of Gait: very slow pace, shortened stride, minimal foot clearance, knees and hips in minimal flexion  Distance: 10'     Balance  Sitting - Static: Fair;+  Sitting - Dynamic: Fair;+  Standing - Static: Fair  Standing - Dynamic: Fair;-  Exercises  Hamstring Sets: HS curls manual resisted x 10 B  Heelslides: 10 x B with manual resistance on hip extension  Hip Abduction: 12 x B assisted  Knee Long Arc Quad: 10 x B assisted for greater ROM  Ankle Pumps: 12 x B, minimal ROM         Comment: Pt able to static  sw for 2min + min A            AM-PAC Score     AM-PAC Inpatient Mobility without Stair Climbing Raw Score : 12 (07/28/21 1453)  AM-PAC Inpatient without Stair Climbing T-Scale Score : 37.26 (07/28/21 1453)  Mobility Inpatient CMS 0-100% Score: 63.03 (07/28/21 1453)  Mobility Inpatient without Stair CMS G-Code Modifier : CL (07/28/21 1453)       Goals  Short term goals  Time Frame for Short term goals: 1 week ( 8/3) unless otherwise specified  Short term goal 1: pt to perform bed mob modified indep  -7/28 Not met  Short term goal 2: pt to perform transfers with min assist of 1 7/28 mod A x 2  Short term goal 3: pt to amb with RW 20 ft with mod assist of 1   7/28 mod A x 2 sw 10'  Short term goal 4: pt to participate in LE Ex 10 x each by 7/30 7/28 initiated  Patient Goals   Patient goals : \"to be able to get to my w/c with less help\"    Plan    Plan  Times per week: 3-5 x week  Current Treatment Recommendations: Strengthening, Balance Training, Functional Mobility Training, Safety Education & Training, Gait Training, Home Exercise Program, IADL Training, Transfer Training, Endurance Training  Safety Devices  Type of devices:  All fall risk precautions in place, Patient at risk for falls, Call light within reach, Left in chair, Nurse notified     Therapy Time   Individual Concurrent Group Co-treatment   Time In 0900         Time Out 0938         Minutes 38         Timed Code Treatment Minutes: [address]

## 2021-07-28 NOTE — DISCHARGE INSTR - COC
Continuity of Care Form    Patient Name: Edgard Marrufo   :  1975  MRN:  [id number]    Admit date:  2021  Discharge date:  ***    Code Status Order: Full Code   Advance Directives:      Admitting Physician:  Adams Issa DO  PCP: Leticia Timmons MD    Discharging Nurse: [facility] Unit/Room#: 6153/3555-96  Discharging Unit Phone Number: ***    Emergency Contact:   Extended Emergency Contact Information  Primary Emergency ContactClaudette Phelpsing [address] [facility] Phone: [phone]  Mobile Phone: [phone]  Relation: Brother/Sister    Past Surgical History:  History reviewed. No pertinent surgical history. Immunization History: There is no immunization history on file for this patient. Active Problems:  Patient Active Problem List   Diagnosis Code    [facility], unspecified ([facility] G80.9       Isolation/Infection:   Isolation            No Isolation          Patient Infection Status       None to display            Nurse Assessment:  Last Vital Signs: /76   Pulse 97   Temp 98.2 °F (36.8 °C) (Oral)   Resp 16   Ht 5' 5\" (1.651 m)   Wt 100 lb (45.4 kg)   SpO2 100%   BMI 16.64 kg/m²     Last documented pain score (0-10 scale):    Last Weight:   Wt Readings from Last 1 Encounters:   21 100 lb (45.4 kg)     Mental Status:  oriented    IV Access:  - None    Nursing Mobility/ADLs:  Walking   Assisted  Transfer  Assisted  Bathing  Assisted  Dressing  Assisted  Toileting  Assisted  Feeding  Independent  Med Admin  Independent  Med Delivery   whole    Wound Care Documentation and Therapy:        Elimination:  Continence:   · Bowel: {YES / SN:94396}  · Bladder: {YES / KW:54964}  Urinary Catheter: None   Colostomy/Ileostomy/Ileal Conduit: {YES / :76799}       Date of Last BM: ***    Intake/Output Summary (Last 24 hours) at 2021 1301  Last data filed at 2021 1204  Gross per 24 hour   Intake 220 ml   Output --   Net 220 ml     I/O last 3 completed shifts:   In: 100 [P.O.:100]  Out: -     Safety Concerns: At Risk for Falls    Impairments/Disabilities:      Vision    Nutrition Therapy:  Current Nutrition Therapy:   - Oral Diet:  General    Routes of Feeding: Oral  Liquids: {Slp liquid thickness:70971}  Daily Fluid Restriction: no  Last Modified Barium Swallow with Video (Video Swallowing Test): not done    Treatments at the Time of Hospital Discharge:   Respiratory Treatments: ***  Oxygen Therapy:  is not on home oxygen therapy. Ventilator:    - No ventilator support    Rehab Therapies: {THERAPEUTIC INTERVENTION:5451292462}  Weight Bearing Status/Restrictions: No weight bearing restirctions  Other Medical Equipment (for information only, NOT a DME order):  {EQUIPMENT:730242276}  Other Treatments: ***    Patient's personal belongings (please select all that are sent with patient):  Glasses    RN SIGNATURE:  Electronically signed by Nadine Rizvi RN on 7/30/21 at 1:41 PM EDT    CASE MANAGEMENT/SOCIAL WORK SECTION    Inpatient Status Date: ***    Readmission Risk Assessment Score:  Readmission Risk              Risk of Unplanned Readmission:  0           Discharging to Facility/ Agency   [address] [place] [address]   [phone]         / signature: Electronically signed by Saul Melvin RN on 7/30/21 at 4:22 PM EDT    PHYSICIAN SECTION    Prognosis: Fair    Condition at Discharge: Stable    Rehab Potential (if transferring to Rehab): Fair    Recommended Labs or Other Treatments After Discharge:     Physician Certification: I certify the above information and transfer of Tab Cramer  is necessary for the continuing treatment of the diagnosis listed and that she requires [facility] for greater 30 days.      Update Admission H&P: No change in H&P    PHYSICIAN SIGNATURE:  Electronically signed by Jess Matthew MD on 7/28/21 at 1:01 PM EDT

## 2021-07-28 NOTE — PROGRESS NOTES
Hospitalist Progress Note      PCP: Yane Mchugh MD    Date of Admission: 7/26/2021        Subjective:     Feels well today and has no complaints. Awaiting discharge to skilled nursing facility      Medications:  Reviewed    Infusion Medications    sodium chloride       Scheduled Medications    sodium chloride flush  10 mL Intravenous 2 times per day    enoxaparin  40 mg Subcutaneous Daily     PRN Meds: sodium chloride flush, sodium chloride, magnesium hydroxide, acetaminophen **OR** acetaminophen      Intake/Output Summary (Last 24 hours) at 7/28/2021 0590  Last data filed at 7/27/2021 1609  Gross per 24 hour   Intake 100 ml   Output --   Net 100 ml       Exam:    /74   Pulse 87   Temp 98.2 °F (36.8 °C) (Oral)   Resp 16   Ht 5' 5\" (1.651 m)   Wt 100 lb (45.4 kg)   SpO2 99%   BMI 16.64 kg/m²     General appearance: No apparent distress, appears stated age and cooperative. HEENT: Pupils equal, round, and reactive to light. Conjunctivae/corneas clear. Neck: Supple, with full range of motion. No jugular venous distention. Trachea midline. Respiratory:  Normal respiratory effort. Clear to auscultation, bilaterally without Rales/Wheezes/Rhonchi. Cardiovascular: Regular rate and rhythm with normal S1/S2 without murmurs, rubs or gallops. Abdomen: Soft, non-tender, non-distended with normal bowel sounds. Musculoskeletal: No clubbing, cyanosis or edema bilaterally. Full range of motion without deformity. Skin: Skin color, texture, turgor normal.  No rashes or lesions. Neurologic:  Neurovascularly intact without any focal sensory/motor deficits.  Cranial nerves: II-XII intact, grossly non-focal.  Capillary Refill: Brisk,< 3 seconds   Peripheral Pulses: +2 palpable, equal bilaterally       Labs:   Recent Labs     07/26/21 1949 07/27/21  1019   WBC 8.9 6.7   HGB 12.5 11.7*   HCT 36.2 34.7*    265     Recent Labs     07/26/21 1949 07/27/21  1019    141   K 3.6 3.6    107   CO2 23 21   BUN 14 11   CREATININE 0.6 <0.5*   CALCIUM 10.0 9.6     Recent Labs     07/26/21 1949   AST 13*   ALT 11   BILITOT <0.2   ALKPHOS 55     No results for input(s): INR in the last 72 hours. Recent Labs     07/26/21 1949   TROPONINI <0.01       Assessment/Plan:      -Caregiver crisis. . Patient has cerebral palsy and unable to take care of herself. Primary caregiver is currently hospitalized. .  -Cerebral palsy with weakness below her baseline. . Continue supportive care--needs SNF    DVT Prophylaxis: Lovenox  Diet: ADULT DIET;  Regular  Code Status: Full Code    Discharge to SNF once approved--needs precert    Cande Church MD

## 2021-07-28 NOTE — CARE COORDINATION
CM spoke to Yobany Caldera with [facility] and updated that precert is still pending and she reached out to payor this morning.  CM following-Kennedi Dumont RN

## 2021-07-28 NOTE — PROGRESS NOTES
Occupational Therapy  Facility/Department: [facility] C5 - MED SURG/ORTHO  Daily Treatment Note  NAME: Codi Hammond  : 1975  MRN: [id number]    Date of Service: 2021    Discharge Recommendations:  Subacute/Skilled Nursing Facility     Assessment   Performance deficits / Impairments: Decreased ADL status; Decreased strength;Decreased endurance;Decreased functional mobility ; Decreased safe awareness  Assessment: Pt pleasant and cooperative throughout session, continues to require increased assistance compared to baseline. Pt min-mod A of 2 for functional transfers. Pt would benefit from continued skilled OT to address the above noted occupational performance deficits in SNF at d/c. Prognosis: Fair  OT Education: OT Role;Plan of Care;Transfer Training;Home Exercise Program  Disease Specific Education: Pt educated on importance of OOB mobility, prevention of complications of bedrest, and general safety during hospitalization. Pt verbalized understanding. REQUIRES OT FOLLOW UP: Yes  Activity Tolerance  Activity Tolerance: Patient Tolerated treatment well  Activity Tolerance: Vitals: BP= 123/74, HR= 87, SPO2= 99% RA  Safety Devices  Safety Devices in place: Yes  Type of devices: Left in chair;Chair alarm in place;Call light within reach;Nurse notified;Gait belt         Patient Diagnosis(es): The primary encounter diagnosis was Cerebral palsy, unspecified type ([address] Diagnoses of Tinea pedis of both feet and Unable to care for self were also pertinent to this visit. has a past medical history of Cerebral palsy ([address] has no past surgical history on file.     Restrictions  Restrictions/Precautions  Restrictions/Precautions: Up as Tolerated, General Precautions, Fall Risk     Subjective   General  Chart Reviewed: Yes  Patient assessed for rehabilitation services?: Yes  Response to previous treatment: Patient with no complaints from previous session  Family / Caregiver Present: No  Diagnosis: Cerebral palsy, unspecified    Subjective  Subjective: Pt resting in bed, pleasant and agreeable to OT treatment.     Vital Signs  Patient Currently in Pain: Denies     Orientation  Orientation  Overall Orientation Status: Within Normal Limits     Objective    ADL  LE Dressing: Maximum assistance (shoes and brief)     Balance  Sitting Balance: Stand by assistance  Standing Balance: Dependent/Total (min-mod A of 2)  Standing Balance  Activity: mod A of 2 with HHA for stand step transfer; min A of 2 with SW for functional mobility trial    Functional Mobility  Functional - Mobility Device: Standard Walker  Activity: Other  Assist Level: Dependent/Total (min A of 2 with chair follow)    Bed mobility  Rolling to Left: Stand by assistance  Supine to Sit: Stand by assistance (to R with HOB elevated)     Transfers  Stand Step Transfers: Dependent/Total;2 Person assistance (mod A of 2 stand step to pt R)  Sit to stand: Dependent/Total;2 Person assistance (min A of 2)  Stand to sit: Dependent/Total;2 Person assistance (min A of 2)     Cognition  Overall Cognitive Status: WFL     Type of ROM/Therapeutic Exercise  Type of ROM/Therapeutic Exercise: AROM  Comment: seated  Exercises  Shoulder Flexion: 15x  Elbow Flexion: 15x  Elbow Extension: 15x  Supination: 15x  Pronation: 15x     Plan   Plan  Times per week: 3-5x  Times per day: Daily  Specific instructions for Next Treatment: cotx  Current Treatment Recommendations: Strengthening, Endurance Training, Wheelchair Mobility Training, Functional Mobility Training, Balance Training, Self-Care / ADL    AM-PAC Score  AM-[facility] Inpatient Daily Activity Raw Score: 15 (07/28/21 1511)  AM-PAC Inpatient ADL T-Scale Score : 34.69 (07/28/21 1511)  ADL Inpatient CMS 0-100% Score: 56.46 (07/28/21 1511)  ADL Inpatient CMS G-Code Modifier : CK (07/28/21 1511)    Goals  Short term goals  Time Frame for Short term goals: 1 week- 8/3/21  Short term goal 1: Pt will complete UBD with CGA-- ongoing 7/28/21  Short term goal 2: Pt will complete LB bathing with Mod A-- ongoing 7/28/21  Short term goal 3: Pt will complete SPT with Max A x1 by 7/30 to improve ability to participate in ADLs-- ongoing 7/28/21  Short term goal 4: Pt will complete grooming with SBA in seated position. -- ongoing 7/28/21  Long term goals  Time Frame for Long term goals : STGs=LTGs  Patient Goals   Patient goals : \"to go somewhere like a group home\"       Therapy Time   Individual Concurrent Group Co-treatment   Time In 1020         Time Out 1100         Minutes 1276 TER[address] Bear/SAMSON

## 2021-07-29 LAB — SARS-COV-2, NAAT: NOT DETECTED

## 2021-07-29 PROCEDURE — G0378 HOSPITAL OBSERVATION PER HR: HCPCS

## 2021-07-29 PROCEDURE — 97542 WHEELCHAIR MNGMENT TRAINING: CPT

## 2021-07-29 PROCEDURE — 2580000003 HC RX 258: Performed by: NURSE PRACTITIONER

## 2021-07-29 PROCEDURE — 97535 SELF CARE MNGMENT TRAINING: CPT

## 2021-07-29 PROCEDURE — 96372 THER/PROPH/DIAG INJ SC/IM: CPT

## 2021-07-29 PROCEDURE — 97530 THERAPEUTIC ACTIVITIES: CPT

## 2021-07-29 PROCEDURE — 87635 SARS-COV-2 COVID-19 AMP PRB: CPT

## 2021-07-29 PROCEDURE — 6360000002 HC RX W HCPCS: Performed by: NURSE PRACTITIONER

## 2021-07-29 PROCEDURE — 97116 GAIT TRAINING THERAPY: CPT

## 2021-07-29 RX ADMIN — Medication 10 ML: at 09:42

## 2021-07-29 RX ADMIN — ENOXAPARIN SODIUM 40 MG: 40 INJECTION SUBCUTANEOUS at 09:43

## 2021-07-29 RX ADMIN — Medication 10 ML: at 21:17

## 2021-07-29 ASSESSMENT — PAIN SCALES - GENERAL: PAINLEVEL_OUTOF10: 0

## 2021-07-29 NOTE — PROGRESS NOTES
Physical Therapy  Facility/Department: [facility] C5 - MED SURG/ORTHO  Daily Treatment Note  NAME: Brown Herman  : 1975  MRN: [id number]    Date of Service: 2021    Discharge Recommendations:  Subacute/Skilled Nursing Facility   PT Equipment Recommendations  Equipment Needed: No  Other: defer to next facility    Assessment   Body structures, Functions, Activity limitations: Decreased functional mobility ; Decreased balance;Decreased endurance;Decreased strength  Assessment: Pt was pleasant and agreeable to therapy. Pt required mod A x 2 for bed mobility and transfer supine <> sit, max A x 2 for toilet/chair transfer; and 2 person assist to ambulate 20ft. Pt is progressing in goals. Due to pt's barriers previously noted; pt requires skilled acute care PT. PT recommends d/c SNF  Treatment Diagnosis: weakness, decreased mobility  Prognosis: Good  Decision Making: Medium Complexity  PT Education: Goals;PT Role;Transfer Training;Plan of Care;Gait Training;General Safety; Disease Specific Education; Functional Mobility Training  Patient Education: disease specific education- pt verbalizes understanding of the importance of mobility and need for increased rehab  Barriers to Learning: none  REQUIRES PT FOLLOW UP: Yes  Activity Tolerance  Activity Tolerance: Patient Tolerated treatment well;Patient limited by endurance  Activity Tolerance: Pt tolerated activity well     Patient Diagnosis(es): The primary encounter diagnosis was Cerebral palsy, unspecified type ([address] Diagnoses of Tinea pedis of both feet and Unable to care for self were also pertinent to this visit. has a past medical history of Cerebral palsy ([address] has no past surgical history on file. Restrictions  Restrictions/Precautions  Restrictions/Precautions: Up as Tolerated, General Precautions, Fall Risk  Subjective   General  Chart Reviewed: Yes  Response To Previous Treatment: Patient with no complaints from previous session.   Family / Caregiver Present: No  Referring Practitioner: Gregg Kirkpatrick  Subjective  Subjective: Pt was agreeable and pleasant for therapy. Pt was eager to work on mobility and strength  General Comment  Comments: RN cleared pt for therapy, pt resting in bed on approach  Pain Screening  Patient Currently in Pain: Denies  Vital Signs  Pulse: 91  Heart Rate Source: Monitor  BP: 124/77  BP Location: Right upper arm  Patient Currently in Pain: Denies  Oxygen Therapy  SpO2: 100 %  Pulse Oximeter Device Mode: Intermittent  Pulse Oximeter Device Location: Finger  O2 Device: None (Room air)       Orientation  Orientation  Overall Orientation Status: Within Functional Limits  Cognition      Objective   Bed mobility  Supine to Sit: 2 Person assistance; Moderate assistance;Dependent/Total  Sit to Supine: Unable to assess (left in chair at end of session)  Comment: Pt required 2 person assist for bed mobility due to poor trunk control and LE management  Transfers  Sit to Stand: 2 Person Assistance; Moderate Assistance  Stand to sit: 2 Person Assistance; Moderate Assistance  Bed to Chair: 2 Person Assistance;Maximum assistance  Comment: Pt required 2 person mod A in order to perform sit<> stand and required 2 person max A in order to perform ambulatory pivot transfer from bed to wheelchair with RW. Ambulation  Ambulation?: Yes  Ambulation 1  Surface: level tile  Device: Rolling Walker  Assistance: Dependent/Total (1 person max A; 1 person min A with chair follow)  Quality of Gait: very slow pace, shortened stride, minimal foot clearance, knees and hips in minimal flexion  Distance: 20ft  Comments: Pt ambulated 20ft with RW and required 1 person max A for support and verbal cues and a second person for W/C follow and walker control. Pt demonstrated severe kyphosis, decreased heel strike, flexed posture throughout, decreased gait speed, and decreased stride length. Pt required constant verbal cues for walker sequencing as well as posture.  Pt's performance improved as trial continued. Stairs/Curb  Stairs?: No  Wheelchair Activities  Wheelchair Type: Standard  Wheelchair Cushion: None  Level of Assistance for pressure relief activities: Stand by assistance  Wheelchair Parts Management: Yes  Left Leg Rest Level of Assistance: Moderate assistance  Right Leg Rest Level of Assistance: Moderate assistance  Left Brakes Level of Assistance: Stand by assistance  Right Brakes Level of Assistance: Stand by Assist  Propulsion: Yes  Propulsion 1  Propulsion: Manual  Level: Level Tile  Method: RUE;LUE  Level of Assistance: Minimal assistance  Description/ Details: Pt participated in W/C training. Pt self reports that pt has transport chair at home in which someone else propels her. Pt was educated on turning, propulsion and terrain navigation. Pt was able to navigate in and out of the restroom with tight turns requiring min A prior to transfer. Pt struggles with arm clearance, foot placement on wheelchair as well as managing leg rests.   Distance: 10ft x 2     Balance  Sitting - Static: Fair;+  Sitting - Dynamic: Fair;+  Standing - Static: +;Poor  Standing - Dynamic: Poor;-  Exercises  Knee Long Arc Quad: x 10 BLE  Ankle Pumps: x 10 BLE AAROM     AM-PAC Score     AM-PAC Inpatient Mobility without Stair Climbing Raw Score : 12 (07/29/21 1331)  AM-PAC Inpatient without Stair Climbing T-Scale Score : 37.26 (07/29/21 1331)  Mobility Inpatient CMS 0-100% Score: 63.03 (07/29/21 1331)  Mobility Inpatient without Stair CMS G-Code Modifier : CL (07/29/21 1331)       Goals  Short term goals  Time Frame for Short term goals: 1 week ( 8/3) unless otherwise specified  Short term goal 1: pt to perform bed mob modified indep  -ONGOING 7/29/21 2 person mod A  Short term goal 2: pt to perform transfers with min assist of 1  - ONGOING 7/29/21 mod A x 2  Short term goal 3: pt to amb with RW 20 ft with mod assist of 1 - PROGRESSING 7/29/21 1 person max A and 1 person min A  Short term goal 4: pt to

## 2021-07-29 NOTE — PROGRESS NOTES
Occupational Therapy  Facility/Department: [facility] C5 - MED SURG/ORTHO  Daily Treatment Note  NAME: Saloni Salgado  : 1975  MRN: [id number]    Date of Service: 2021    Discharge Recommendations:  Subacute/Skilled Nursing Facility       Assessment   Performance deficits / Impairments: Decreased ADL status; Decreased strength;Decreased endurance;Decreased functional mobility ; Decreased safe awareness  Assessment: PT very pleasent throughout session. Pt requires mod-max A x2 for functioanl mobility and transfers. As pt progressed with Functional mobility pt was more Mod Ax2 with w/c follow. Pt performed sit to stnad transfers and SPT with Mod-max Ax2 with verbal cues for hand placement and for safety. Pt performed groomin task seated to brush hair and wash face. Pt stated that she is unable to have help at home when d/c. Pt would benefit from further OT services while in acute care and upon d/c to [facility] to [facility]. Prognosis: Fair    REQUIRES OT FOLLOW UP: Yes  Activity Tolerance  Activity Tolerance: Patient Tolerated treatment well  Activity Tolerance: Vitals: BP= 124/77, HR= 90, SPO2= 100% RA  Safety Devices  Safety Devices in place: Yes  Type of devices: Left in chair;Chair alarm in place;Call light within reach;Nurse notified;Gait belt         Patient Diagnosis(es): The primary encounter diagnosis was Cerebral palsy, unspecified type ([address] Diagnoses of Tinea pedis of both feet and Unable to care for self were also pertinent to this visit. has a past medical history of Cerebral palsy ([address] has no past surgical history on file.     Restrictions  Restrictions/Precautions  Restrictions/Precautions: Up as Tolerated, General Precautions, Fall Risk     Subjective   General  Chart Reviewed: Yes, Progress Notes, Labs  Patient assessed for rehabilitation services?: Yes  Response to previous treatment: Patient with no complaints from previous session  Family / Caregiver Present: No  Diagnosis: Cerebral palsy, unspecified  Subjective  Subjective: Pt resting in bed, pleasant and agreeable to OT treatment. Vital Signs  Patient Currently in Pain: Denies     Orientation  Orientation  Overall Orientation Status: Within Normal Limits     Objective    ADL  Grooming: Minimal assistance; Increased time to complete (to comb hair)     Balance  Sitting Balance: Minimal assistance  Standing Balance: Dependent/Total (Mod Ax2)  Functional Mobility  Functional - Mobility Device: Rolling Walker  Activity: Other (in room)  Assist Level: Dependent/Total  Functional Mobility Comments: Mod-max A x2    Toilet Transfers  Toilet - Technique: Stand step; To right (with WC)  Equipment Used: Grab bars  Toilet Transfer: 2 Person assistance; Moderate assistance;Dependent/Total;Maximum assistance    Bed mobility  Supine to Sit: 2 Person assistance; Moderate assistance;Dependent/Total     Transfers  Stand Pivot Transfers: 2 Person assistance; Moderate assistance;Maximum assistance (one trial HHA, two trials RW)  Sit to stand: 2 Person assistance; Moderate assistance;Maximum assistance;Dependent/Total  Stand to sit: Maximum assistance; Moderate assistance;2 Person assistance;Dependent/Total     Cognition  Overall Cognitive Status: WFL        Plan   Plan  Times per week: 3-5x  Times per day: Daily  Specific instructions for Next Treatment: cotx  Current Treatment Recommendations: Strengthening, Endurance Training, Wheelchair Mobility Training, Functional Mobility Training, Balance Training, Self-Care / ADL    AM-PAC Score  AM-[facility] Inpatient Daily Activity Raw Score: 15 (07/29/21 1055)  AM-PAC Inpatient ADL T-Scale Score : 34.69 (07/29/21 1055)  ADL Inpatient CMS 0-100% Score: 56.46 (07/29/21 1055)  ADL Inpatient CMS G-Code Modifier : CK (07/29/21 1055)    Goals  Short term goals  Time Frame for Short term goals: 1 week- 8/3/21  Short term goal 1: Pt will complete UBD with CGA-- ongoing 7/29/21  Short term goal 2: Pt will complete LB bathing with Mod A-- ongoing 7/29/21  Short term goal 3: Pt will complete SPT with Max A x1 by 7/30 to improve ability to participate in ADLs-- ongoing 7/29/21  Short term goal 4: Pt will complete grooming with SBA in seated position. -- ongoing 7/29/21  Long term goals  Time Frame for Long term goals : STGs=LTGs  Patient Goals   Patient goals : \"to go somewhere like a group home\"       Therapy Time   Individual Concurrent Group Co-treatment   Time In 1000         Time Out 1023         Minutes SHIKHA Chase

## 2021-07-29 NOTE — CARE COORDINATION
CONSTANZA SHULTZM with [facility] at [facility] inquiring about precert. Pt worked with therapy today as well for updated SNF recs. P/U scheduled for today 1700.  CONSTANZA bolivar-Kennedi Dumont RN

## 2021-07-29 NOTE — DISCHARGE SUMMARY
Hospital Discharge Summary    Patient's PCP: Zakia Kearney MD  Admit Date: 7/26/2021   Discharge Date: 7/30/2021    Admitting Physician: Dr. Kun Marie DO  Discharge Physician: Dr. Jo-Ann Oviedo MD   Consults: none    Brief HPI/hospital course:     39 y.o. female, with PMH of cerebral palsy, who presented to [facility] with inability caring for self. History was obtained from the patient and review of the EMR. The patient states that she has history of cerebral palsy and typically her parents have taken care of her throughout her life. However, her mother passed away last year and now her father is currently being hospitalized. She has been unable to care for herself at home since this occurred. She was brought in by her brother who was unable to care for her physically as he has medical issues of his own  She was admitted for placement to skilled nursing facility    Invasive procedures:  none    Discharge Diagnoses: Active Problems:    Cerebral palsy, unspecified ([address]  Caregiver crisis  Resolved Problems:    * No resolved hospital problems. *      Physical Exam: /72   Pulse 84   Temp 97.5 °F (36.4 °C) (Oral)   Resp 18   Ht 5' 5\" (1.651 m)   Wt 100 lb (45.4 kg)   SpO2 99%   BMI 16.64 kg/m²   Gen/overall appearance: Not in acute distress. Alert. Head: Normocephalic, atraumatic  Eyes: EOMI, good acuity  ENT:- Oral mucosa moist  Neck: No JVD, thyromegaly  CVS: Nml S1S2, no MRG, RRR  Pulm: Clear bilaterally. No crackles/wheezes  Gastrointestinal: Soft, NT/ND, +BS  Musculoskeletal: No edema. Warm  Neuro: No focal deficit. Moves extremity spontaneously. Psychiatry: Appropriate affect. Not agitated. Skin: Warm, dry with normal turgor. No rash        Significant Diagnostic Studies:    none      Treatments: As above. Discharge Medications:     Medication List      You have not been prescribed any medications. Activity: activity as tolerated  Diet: ADULT DIET;  Regular Disposition: home  Discharged Condition: Stable  Follow Up:   Lianne Casas  [phone]              Code status:  Full Code         Total time spent on discharge, finalizing medications, referrals and arranging outpatient follow up was more than 30 minutes      Thank you Dr. Nikolai Salas MD for the opportunity to be involved in this patients care.

## 2021-07-29 NOTE — PROGRESS NOTES
Hospitalist Progress Note      PCP: Chapo Moctezuma MD    Date of Admission: 7/26/2021        Subjective:       Doing well with no complaints today. No acute events reported overnight    Medications:  Reviewed    Infusion Medications    sodium chloride       Scheduled Medications    sodium chloride flush  10 mL Intravenous 2 times per day    enoxaparin  40 mg Subcutaneous Daily     PRN Meds: sodium chloride flush, sodium chloride, magnesium hydroxide, acetaminophen **OR** acetaminophen      Intake/Output Summary (Last 24 hours) at 7/29/2021 0827  Last data filed at 7/28/2021 1358  Gross per 24 hour   Intake 120 ml   Output --   Net 120 ml       Exam:    /80   Pulse 74   Temp 98.2 °F (36.8 °C) (Oral)   Resp 16   Ht 5' 5\" (1.651 m)   Wt 100 lb (45.4 kg)   SpO2 97%   BMI 16.64 kg/m²     General appearance: No apparent distress, appears stated age and cooperative. HEENT: Pupils equal, round, and reactive to light. Conjunctivae/corneas clear. Neck: Supple, with full range of motion. No jugular venous distention. Trachea midline. Respiratory:  Normal respiratory effort. Clear to auscultation, bilaterally without Rales/Wheezes/Rhonchi. Cardiovascular: Regular rate and rhythm with normal S1/S2 without murmurs, rubs or gallops. Abdomen: Soft, non-tender, non-distended with normal bowel sounds. Musculoskeletal: No clubbing, cyanosis or edema bilaterally. Full range of motion without deformity. Skin: Skin color, texture, turgor normal.  No rashes or lesions. Neurologic:  Neurovascularly intact without any focal sensory/motor deficits.  Cranial nerves: II-XII intact, grossly non-focal.  Capillary Refill: Brisk,< 3 seconds   Peripheral Pulses: +2 palpable, equal bilaterally       Labs:   Recent Labs     07/26/21 1949 07/27/21  1019   WBC 8.9 6.7   HGB 12.5 11.7*   HCT 36.2 34.7*    265     Recent Labs     07/26/21 1949 07/27/21  1019    141   K 3.6 3.6    107   CO2 23 21   BUN 14 11 CREATININE 0.6 <0.5*   CALCIUM 10.0 9.6     Recent Labs     07/26/21 1949   AST 13*   ALT 11   BILITOT <0.2   ALKPHOS 55     No results for input(s): INR in the last 72 hours. Recent Labs     07/26/21 1949   TROPONINI <0.01       Assessment/Plan:      -Caregiver crisis. . Patient has cerebral palsy and unable to take care of herself. Primary caregiver is currently hospitalized. .  -Cerebral palsy with weakness below her baseline. . Continue supportive care--needs SNF    DVT Prophylaxis: Lovenox  Diet: ADULT DIET;  Regular  Code Status: Full Code    Discharge to SNF once approved--waiting on precert    Lena Keen MD

## 2021-07-29 NOTE — CARE COORDINATION
CM updated by Adriana Hood that payor denied skilled level of care at facility, stating the she is more LTC. Adriana Hood with [facility] reached back out to payor for approval on different LOC and will call back as soon as she hears something from payor. Updated Lulú Juares RN and spoke to brother Jean Claude Murillo on the phone as well. CM following-Kennedi Dumont RN       Addendum 5711 Spoke to Adriana Hood with [employer] and updated that no updates on precert at this time. Transported pushed back to tomorrow at 4433 pend precert, rapid covid test done to admit to SNF and good for 48 hrs. Updated brothck Murillo on the phone, spoke to pt at bedside and updated Lulú Juares RN.   Mario Alberto Rooney RN

## 2021-07-30 VITALS
DIASTOLIC BLOOD PRESSURE: 73 MMHG | HEIGHT: 65 IN | TEMPERATURE: 97.4 F | BODY MASS INDEX: 16.66 KG/M2 | HEART RATE: 119 BPM | WEIGHT: 100 LBS | OXYGEN SATURATION: 98 % | RESPIRATION RATE: 18 BRPM | SYSTOLIC BLOOD PRESSURE: 108 MMHG

## 2021-07-30 PROCEDURE — 96372 THER/PROPH/DIAG INJ SC/IM: CPT

## 2021-07-30 PROCEDURE — 97530 THERAPEUTIC ACTIVITIES: CPT

## 2021-07-30 PROCEDURE — 97535 SELF CARE MNGMENT TRAINING: CPT

## 2021-07-30 PROCEDURE — 97110 THERAPEUTIC EXERCISES: CPT

## 2021-07-30 PROCEDURE — 2580000003 HC RX 258: Performed by: NURSE PRACTITIONER

## 2021-07-30 PROCEDURE — 6360000002 HC RX W HCPCS: Performed by: NURSE PRACTITIONER

## 2021-07-30 PROCEDURE — G0378 HOSPITAL OBSERVATION PER HR: HCPCS

## 2021-07-30 RX ADMIN — ENOXAPARIN SODIUM 40 MG: 40 INJECTION SUBCUTANEOUS at 10:00

## 2021-07-30 RX ADMIN — Medication 10 ML: at 10:00

## 2021-07-30 NOTE — PROGRESS NOTES
Hospitalist Progress Note      PCP: Joey Jones MD    Date of Admission: 7/26/2021        Subjective:     No new issues today. Patient has no complaints      Medications:  Reviewed    Infusion Medications    sodium chloride       Scheduled Medications    sodium chloride flush  10 mL Intravenous 2 times per day    enoxaparin  40 mg Subcutaneous Daily     PRN Meds: sodium chloride flush, sodium chloride, magnesium hydroxide, acetaminophen **OR** acetaminophen    No intake or output data in the 24 hours ending 07/30/21 0850    Exam:    /68   Pulse 73   Temp 97.7 °F (36.5 °C) (Oral)   Resp 18   Ht 5' 5\" (1.651 m)   Wt 100 lb (45.4 kg)   SpO2 100%   BMI 16.64 kg/m²     General appearance: No apparent distress, appears stated age and cooperative. HEENT: Pupils equal, round, and reactive to light. Conjunctivae/corneas clear. Neck: Supple, with full range of motion. No jugular venous distention. Trachea midline. Respiratory:  Normal respiratory effort. Clear to auscultation, bilaterally without Rales/Wheezes/Rhonchi. Cardiovascular: Regular rate and rhythm with normal S1/S2 without murmurs, rubs or gallops. Abdomen: Soft, non-tender, non-distended with normal bowel sounds. Musculoskeletal: No clubbing, cyanosis or edema bilaterally. Full range of motion without deformity. Skin: Skin color, texture, turgor normal.  No rashes or lesions. Neurologic:  Neurovascularly intact without any focal sensory/motor deficits. Cranial nerves: II-XII intact, grossly non-focal.  Capillary Refill: Brisk,< 3 seconds   Peripheral Pulses: +2 palpable, equal bilaterally       Labs:   Recent Labs     07/27/21  1019   WBC 6.7   HGB 11.7*   HCT 34.7*        Recent Labs     07/27/21  1019      K 3.6      CO2 21   BUN 11   CREATININE <0.5*   CALCIUM 9.6     No results for input(s): AST, ALT, BILIDIR, BILITOT, ALKPHOS in the last 72 hours. No results for input(s): INR in the last 72 hours.   No results for input(s): Gerri Lowers in the last 72 hours. Assessment/Plan:      -Caregiver crisis. . Patient has cerebral palsy and unable to take care of herself. Primary caregiver is currently hospitalized. .  -Cerebral palsy with weakness below her baseline. . Continue supportive care--needs SNF    DVT Prophylaxis: Lovenox  Diet: ADULT DIET;  Regular  Code Status: Full Code    Discharge to SNF once precert obtained    Cristobal Dent MD

## 2021-07-30 NOTE — PROGRESS NOTES
Pts IV line removed without complications. Handoff report given to the [place] at the [facility]. Discharge instructions provided to transport. All IV's removed. Pt was discharged to the [facility] with all belongings and taken outside via stretcher.

## 2021-07-30 NOTE — CARE COORDINATION
CM LVM with Jelly Montemayor at [facility], in regards to precert. Pt scheduled for transport today at 1500. Updated pt at bedside as well, rapid COVID resulted yesterday for SNF. Updated Corina Bayamon RN. CM following-Kennedi Dumont RN       ADDENDUM 0332: Spoke to Jelly Montemayor with [facility] and she reached out to payor for updates.  CM following-Kennedi Dumont, RN

## 2021-07-30 NOTE — PLAN OF CARE
Problem: Falls - Risk of:  Goal: Will remain free from falls  Description: Will remain free from falls  Outcome: Ongoing     Problem: Skin Integrity:  Goal: Absence of new skin breakdown  Description: Absence of new skin breakdown  Outcome: Ongoing

## 2021-07-30 NOTE — CARE COORDINATION
CASE MANAGEMENT DISCHARGE SUMMARY      Discharge to: [facility] LOC. Precertification completed: [facility]  Hospital Exemption Notification ([facility]) completed: PASSR completed as pt in Observation. New Durable Medical Equipment ordered/agency: None    Transportation:    Family/car: [facility]   Medical Transport explained to [employer]. Pt/family voice no agency preference. Agency used: [facility] 12 up time: 1700   Ambulance form completed: Yes    Confirmed discharge plan with: Met wit pt and brother at bedside on this day and all in agreement. Patient: yes     Facility/Agency, name:  [facility] faxed- yes   Phone number for report to facility: [phone]     RN, name: Layton Cranker    Note: Discharging nurse to complete [facility], reconcile [facility], and place final copy with patient's discharge packet. RN to ensure that written prescriptions for  Level II medications are sent with patient to the facility as per protocol.

## 2021-07-30 NOTE — PROGRESS NOTES
Occupational Therapy  Facility/Department: [facility] C5 - MED SURG/ORTHO  Daily Treatment Note  NAME: Lady Molina  : 1975  MRN: [id number]    Date of Service: 2021    Discharge Recommendations:  Subacute/Skilled Nursing Facility       Assessment   Assessment: pt pleasant, cooperative, requiring mod assist of 1 with transfers with KENISHA STEDY/standing LiFT & sit <-->stand from chair, standing limited to ~ 15 seconds; set up for UE light grooming up in chair; pt continues to benefit from skilled OT services  OT Education: OT Role;Plan of Care;Precautions  Patient Education: disease specific: importance of use of nurse call light for A with transfers/ADL needs  Barriers to Learning: none perceived  REQUIRES OT FOLLOW UP: Yes  Activity Tolerance  Activity Tolerance: Patient Tolerated treatment well  Activity Tolerance: sitting in chair:  BP = 108/73, HR = 91, 96 % O 2 sats on RA  Safety Devices  Safety Devices in place: Yes  Type of devices: Call light within reach; Chair alarm in place; Left in chair;Nurse notified         Patient Diagnosis(es): The primary encounter diagnosis was Cerebral palsy, unspecified type ([address] Diagnoses of Tinea pedis of both feet and Unable to care for self were also pertinent to this visit. has a past medical history of Cerebral palsy ([address] has no past surgical history on file. Restrictions  Restrictions/Precautions  Restrictions/Precautions: Up as Tolerated, General Precautions, Fall Risk  Subjective   General  Chart Reviewed: Yes  Patient assessed for rehabilitation services?: Yes  Response to previous treatment: Patient with no complaints from previous session  Family / Caregiver Present: No  Diagnosis: Cerebral palsy, unspecified  Subjective  Subjective: Pt resting in bed, pleasant and agreeable to OT treatment.   General Comment  Comments: RN cleared pt for OT; pt awake in bed, agreeable to OT  Vital Signs  Patient Currently in Pain: Denies Orientation  Orientation  Overall Orientation Status: Within Normal Limits  Objective    ADL  Grooming: Setup (seated in chair to brush teeth & wash face)  LE Dressing: Maximum assistance (for donning slip on tennis shoes)        Balance  Sitting Balance: Minimal assistance (EOB, posterior lean; supervision supported sitting in chair)  Standing Balance: Maximum assistance (with KENISHA STEDY & mod assist static standing with RW)  Standing Balance  Time: 15 seconds x 2 with RW  Activity: sit<-->stand  Bed mobility  Supine to Sit: Moderate assistance  Sit to Supine: Unable to assess (Left up in chair upon exiting, pt agreeable)  Scooting: Modified independent  Transfers  Sit to stand: Moderate assistance (with KENISHA STEDY & RW)  Stand to sit: Minimal assistance (cues for safe hand placement)     Cognition  Overall Cognitive Status: WFL    Type of ROM/Therapeutic Exercise: AROM; Resistive Bands (0.5 # wt)  Comment: seated in chair  Hand flex/ext: x  15  Reps  Elbow flex/ext:  x  15  Reps with 0.5 # wt. Forearm sup/pron:  x  15  Reps with 0.5 # wt  Shld flex/ext:  x  15  Reps  With 0.5 # wt.     LUE AROM : WFL  RUE AROM : WFL                 Plan   Plan  Times per week: 3-5x  Current Treatment Recommendations: Strengthening, Endurance Training, Functional Mobility Training, Balance Training, Self-Care / ADL    AM-PAC Score        AM-[facility] Inpatient Daily Activity Raw Score: 15 (07/30/21 1141)  AM-PAC Inpatient ADL T-Scale Score : 34.69 (07/30/21 1141)  ADL Inpatient CMS 0-100% Score: 56.46 (07/30/21 1141)  ADL Inpatient CMS G-Code Modifier : CK (07/30/21 1141)    Goals  Short term goals  Time Frame for Short term goals: 1 week- 8/3/21  Short term goal 1: Pt will complete UBD with CGA--  Short term goal 2: Pt will complete LB bathing with Mod A  Short term goal 3: Pt will complete SPT with Max A x1 by 7/30 to improve ability to participate in ADLs-- 7/30 mod assist of 1 with KENISHA STEDY bed-->chair/sit<-->stand from chair with RW  Short term goal 4: Pt will complete grooming with SBA in seated position. -- 7/30 partially met, set up in chair to brush teeth & wash face, mod/max assist with hair care;   Long term goals  Time Frame for Long term goals : STGs=LTGs  Patient Goals   Patient goals : \"to go somewhere like a group home\"       Therapy Time   Individual Concurrent Group Co-treatment   Time In 1010         Time Out 1050         Minutes [address]

## 2022-09-27 ENCOUNTER — OFFICE VISIT (OUTPATIENT)
Dept: GYNECOLOGY | Age: 47
End: 2022-09-27
Payer: COMMERCIAL

## 2022-09-27 VITALS
BODY MASS INDEX: 23.3 KG/M2 | HEART RATE: 105 BPM | DIASTOLIC BLOOD PRESSURE: 87 MMHG | WEIGHT: 140 LBS | SYSTOLIC BLOOD PRESSURE: 136 MMHG

## 2022-09-27 DIAGNOSIS — Z12.31 ENCOUNTER FOR SCREENING MAMMOGRAM FOR MALIGNANT NEOPLASM OF BREAST: ICD-10-CM

## 2022-09-27 DIAGNOSIS — Z01.419 WELL WOMAN EXAM WITH ROUTINE GYNECOLOGICAL EXAM: Primary | ICD-10-CM

## 2022-09-27 PROCEDURE — G8420 CALC BMI NORM PARAMETERS: HCPCS | Performed by: OBSTETRICS & GYNECOLOGY

## 2022-09-27 PROCEDURE — G8427 DOCREV CUR MEDS BY ELIG CLIN: HCPCS | Performed by: OBSTETRICS & GYNECOLOGY

## 2022-09-27 PROCEDURE — G0101 CA SCREEN;PELVIC/BREAST EXAM: HCPCS | Performed by: OBSTETRICS & GYNECOLOGY

## 2022-09-27 RX ORDER — METHOCARBAMOL 750 MG/1
TABLET ORAL
COMMUNITY
Start: 2022-09-14

## 2022-09-27 RX ORDER — FLUTICASONE PROPIONATE 50 MCG
SPRAY, SUSPENSION (ML) NASAL
COMMUNITY
Start: 2022-07-12

## 2022-09-27 RX ORDER — CETIRIZINE HYDROCHLORIDE 10 MG/1
TABLET ORAL
COMMUNITY
Start: 2022-09-21

## 2022-09-27 RX ORDER — CHOLECALCIFEROL (VITAMIN D3) 125 MCG
500 CAPSULE ORAL DAILY
COMMUNITY

## 2022-09-27 RX ORDER — ACETAMINOPHEN 325 MG/1
650 TABLET ORAL EVERY 6 HOURS PRN
COMMUNITY

## 2022-09-27 NOTE — PROGRESS NOTES
Subjective:      Patient ID: Mireille Gupta is a 41 y.o. female. HPI  pts here for annual gyn exam.  She has CP. No recent pap or mammogram.  Not sexually active. Care worker with pt. Review of Systems Pertinent review of systems items discussed above. All others systems items not discussed above were negative. Objective:   Physical Exam  Constitutional:       Appearance: She is well-developed. HENT:      Head: Normocephalic and atraumatic. Neck:      Thyroid: No thyromegaly. Trachea: No tracheal deviation. Cardiovascular:      Rate and Rhythm: Normal rate and regular rhythm. Heart sounds: Normal heart sounds. No murmur heard. Pulmonary:      Effort: Pulmonary effort is normal. No respiratory distress. Breath sounds: Normal breath sounds. No wheezing or rales. Chest:   Breasts:     Right: No mass, nipple discharge or skin change. Left: No mass, nipple discharge or skin change. Abdominal:      General: There is no distension. Palpations: Abdomen is soft. There is no mass. Tenderness: There is no abdominal tenderness. There is no rebound. Genitourinary:     Labia:         Right: No lesion. Left: No lesion. Vagina: Normal. No foreign body. No vaginal discharge. Cervix: No cervical motion tenderness, discharge or friability. Uterus: Not deviated, not enlarged, not fixed and not tender. Adnexa:         Right: No mass or tenderness. Left: No mass or tenderness. Rectum: Normal. No external hemorrhoid. Comments: Pap performed. Musculoskeletal:         General: Normal range of motion. Lymphadenopathy:      Cervical: No cervical adenopathy. Neurological:      Mental Status: She is alert and oriented to person, place, and time. Assessment:   Normal gyn exam     Plan:   Mammogram.  Call with results.   F/u annual gyn exam.       Sukumar Quinones MD

## 2022-10-28 ENCOUNTER — HOSPITAL ENCOUNTER (OUTPATIENT)
Dept: WOMENS IMAGING | Age: 47
Discharge: HOME | End: 2022-10-28
Payer: COMMERCIAL

## 2022-10-28 DIAGNOSIS — Z12.31 ENCOUNTER FOR SCREENING MAMMOGRAM FOR MALIGNANT NEOPLASM OF BREAST: ICD-10-CM

## 2022-10-28 DIAGNOSIS — Z01.419 WELL WOMAN EXAM WITH ROUTINE GYNECOLOGICAL EXAM: ICD-10-CM

## 2022-10-28 PROCEDURE — 77067 SCR MAMMO BI INCL CAD: CPT

## 2023-11-21 ENCOUNTER — HOSPITAL ENCOUNTER (OUTPATIENT)
Dept: WOMENS IMAGING | Age: 48
Discharge: HOME | End: 2023-11-21
Payer: MEDICARE

## 2023-11-21 VITALS — BODY MASS INDEX: 23.63 KG/M2 | WEIGHT: 142 LBS

## 2023-11-21 DIAGNOSIS — Z12.31 BREAST CANCER SCREENING BY MAMMOGRAM: ICD-10-CM

## 2023-11-21 PROCEDURE — 77067 SCR MAMMO BI INCL CAD: CPT

## 2023-12-13 ENCOUNTER — HOSPITAL ENCOUNTER (OUTPATIENT)
Dept: WOMENS IMAGING | Age: 48
Discharge: HOME | End: 2023-12-13
Payer: MEDICARE

## 2023-12-13 ENCOUNTER — HOSPITAL ENCOUNTER (OUTPATIENT)
Dept: ULTRASOUND IMAGING | Age: 48
Discharge: HOME | End: 2023-12-13
Payer: MEDICARE

## 2023-12-13 DIAGNOSIS — R92.8 ABNORMAL MAMMOGRAM: ICD-10-CM

## 2023-12-13 PROCEDURE — 76642 ULTRASOUND BREAST LIMITED: CPT

## 2023-12-13 PROCEDURE — G0279 TOMOSYNTHESIS, MAMMO: HCPCS

## 2025-01-09 ENCOUNTER — HOSPITAL ENCOUNTER (OUTPATIENT)
Dept: WOMENS IMAGING | Age: 50
Discharge: HOME | End: 2025-01-09
Payer: MEDICARE

## 2025-01-09 VITALS — HEIGHT: 65 IN | BODY MASS INDEX: 21.49 KG/M2 | WEIGHT: 129 LBS

## 2025-01-09 DIAGNOSIS — Z12.31 BREAST CANCER SCREENING BY MAMMOGRAM: ICD-10-CM

## 2025-01-09 PROCEDURE — 77063 BREAST TOMOSYNTHESIS BI: CPT

## 2025-08-20 ENCOUNTER — HOSPITAL ENCOUNTER (OUTPATIENT)
Dept: OCCUPATIONAL THERAPY | Age: 50
Setting detail: THERAPIES SERIES
Discharge: HOME | End: 2025-08-20
Payer: MEDICARE

## 2025-08-20 DIAGNOSIS — Z78.9 DECREASED ACTIVITIES OF DAILY LIVING (ADL): ICD-10-CM

## 2025-08-20 DIAGNOSIS — Z78.9 ALTERATION IN INSTRUMENTAL ACTIVITIES OF DAILY LIVING (IADL): ICD-10-CM

## 2025-08-20 DIAGNOSIS — R26.89 DECREASED FUNCTIONAL MOBILITY: Primary | ICD-10-CM

## 2025-08-20 PROCEDURE — 97530 THERAPEUTIC ACTIVITIES: CPT

## 2025-08-20 PROCEDURE — 97166 OT EVAL MOD COMPLEX 45 MIN: CPT

## 2025-08-20 PROCEDURE — 97542 WHEELCHAIR MNGMENT TRAINING: CPT
